# Patient Record
Sex: FEMALE | Race: WHITE | NOT HISPANIC OR LATINO | Employment: OTHER | URBAN - METROPOLITAN AREA
[De-identification: names, ages, dates, MRNs, and addresses within clinical notes are randomized per-mention and may not be internally consistent; named-entity substitution may affect disease eponyms.]

---

## 2017-01-10 ENCOUNTER — TELEPHONE (OUTPATIENT)
Dept: INTERNAL MEDICINE | Facility: CLINIC | Age: 77
End: 2017-01-10

## 2017-01-10 NOTE — TELEPHONE ENCOUNTER
Informed Pema that Dr Cortes was waiting on information from the pts medical records before she finish the death certificate.

## 2017-01-10 NOTE — TELEPHONE ENCOUNTER
----- Message from Aisha Jones sent at 2017  8:38 AM CST -----  Contact: [employer]  home/Pema/[phone]  Pema from [employer] said that she has been calling and left several messages asking if  signed pt's death certificate, she stated that she needs someone to call her today to let her know the status. Please call and advise          Thank you

## 2017-01-20 ENCOUNTER — TELEPHONE (OUTPATIENT)
Dept: INTERNAL MEDICINE | Facility: CLINIC | Age: 77
End: 2017-01-20

## 2017-01-20 NOTE — TELEPHONE ENCOUNTER
Pema informed that death certificate can not be filled out until medical records are received. Dr Cortes has not received the records. [facility] is helping call medical records from [facility].